# Patient Record
Sex: FEMALE | Race: WHITE | NOT HISPANIC OR LATINO | Employment: UNEMPLOYED | URBAN - METROPOLITAN AREA
[De-identification: names, ages, dates, MRNs, and addresses within clinical notes are randomized per-mention and may not be internally consistent; named-entity substitution may affect disease eponyms.]

---

## 2023-09-19 LAB
BASOPHILS (10*3/UL) IN BLOOD BY AUTOMATED COUNT: 0.03 X10E9/L (ref 0–0.1)
BASOPHILS/100 LEUKOCYTES IN BLOOD BY AUTOMATED COUNT: 0.3 % (ref 0–2)
EOSINOPHILS (10*3/UL) IN BLOOD BY AUTOMATED COUNT: 0.11 X10E9/L (ref 0–0.4)
EOSINOPHILS/100 LEUKOCYTES IN BLOOD BY AUTOMATED COUNT: 1.3 % (ref 0–6)
ERYTHROCYTE DISTRIBUTION WIDTH (RATIO) BY AUTOMATED COUNT: 15.9 % (ref 11.5–14.5)
ERYTHROCYTE MEAN CORPUSCULAR HEMOGLOBIN CONCENTRATION (G/DL) BY AUTOMATED: 31.5 G/DL (ref 32–36)
ERYTHROCYTE MEAN CORPUSCULAR VOLUME (FL) BY AUTOMATED COUNT: 95 FL (ref 80–100)
ERYTHROCYTES (10*6/UL) IN BLOOD BY AUTOMATED COUNT: 4 X10E12/L (ref 4–5.2)
HEMATOCRIT (%) IN BLOOD BY AUTOMATED COUNT: 38.1 % (ref 36–46)
HEMOGLOBIN (G/DL) IN BLOOD: 12 G/DL (ref 12–16)
IMMATURE GRANULOCYTES/100 LEUKOCYTES IN BLOOD BY AUTOMATED COUNT: 1.3 % (ref 0–0.9)
IRON (UG/DL) IN SER/PLAS: 45 UG/DL (ref 35–150)
LEUKOCYTES (10*3/UL) IN BLOOD BY AUTOMATED COUNT: 8.6 X10E9/L (ref 4.4–11.3)
LYMPHOCYTES (10*3/UL) IN BLOOD BY AUTOMATED COUNT: 1.24 X10E9/L (ref 0.8–3)
LYMPHOCYTES/100 LEUKOCYTES IN BLOOD BY AUTOMATED COUNT: 14.4 % (ref 13–44)
MONOCYTES (10*3/UL) IN BLOOD BY AUTOMATED COUNT: 0.4 X10E9/L (ref 0.05–0.8)
MONOCYTES/100 LEUKOCYTES IN BLOOD BY AUTOMATED COUNT: 4.6 % (ref 2–10)
NEUTROPHILS (10*3/UL) IN BLOOD BY AUTOMATED COUNT: 6.75 X10E9/L (ref 1.6–5.5)
NEUTROPHILS/100 LEUKOCYTES IN BLOOD BY AUTOMATED COUNT: 78.1 % (ref 40–80)
PLATELETS (10*3/UL) IN BLOOD AUTOMATED COUNT: 370 X10E9/L (ref 150–450)

## 2023-11-21 ENCOUNTER — OFFICE VISIT (OUTPATIENT)
Dept: GASTROENTEROLOGY | Facility: CLINIC | Age: 78
End: 2023-11-21
Payer: MEDICARE

## 2023-11-21 VITALS
DIASTOLIC BLOOD PRESSURE: 82 MMHG | WEIGHT: 167 LBS | BODY MASS INDEX: 34.9 KG/M2 | HEART RATE: 109 BPM | SYSTOLIC BLOOD PRESSURE: 144 MMHG

## 2023-11-21 DIAGNOSIS — R19.7 DIARRHEA, UNSPECIFIED TYPE: Primary | ICD-10-CM

## 2023-11-21 DIAGNOSIS — R10.13 EPIGASTRIC PAIN: ICD-10-CM

## 2023-11-21 PROCEDURE — 1159F MED LIST DOCD IN RCRD: CPT | Performed by: INTERNAL MEDICINE

## 2023-11-21 PROCEDURE — 99214 OFFICE O/P EST MOD 30 MIN: CPT | Performed by: INTERNAL MEDICINE

## 2023-11-21 PROCEDURE — 1160F RVW MEDS BY RX/DR IN RCRD: CPT | Performed by: INTERNAL MEDICINE

## 2023-11-21 PROCEDURE — 1126F AMNT PAIN NOTED NONE PRSNT: CPT | Performed by: INTERNAL MEDICINE

## 2023-11-21 RX ORDER — ROSUVASTATIN CALCIUM 10 MG/1
10 TABLET, COATED ORAL DAILY
COMMUNITY

## 2023-11-21 RX ORDER — METFORMIN HYDROCHLORIDE 500 MG/1
500 TABLET ORAL
COMMUNITY

## 2023-11-21 RX ORDER — PIOGLITAZONEHYDROCHLORIDE 15 MG/1
15 TABLET ORAL DAILY
COMMUNITY

## 2023-11-21 RX ORDER — LOSARTAN POTASSIUM 50 MG/1
50 TABLET ORAL DAILY
COMMUNITY

## 2023-11-21 RX ORDER — AMLODIPINE BESYLATE 5 MG/1
TABLET ORAL DAILY
COMMUNITY

## 2023-11-21 NOTE — PATIENT INSTRUCTIONS
You can use Imodium (Loperamide) as needed to help with diarrhea. It is fine if you take this on a regular basis. Make sure that you are not becoming constipated.      You have been scheduled for an upper endoscopy (EGD).  You were given instructions for preparing for this test in the office today.  If you have questions about these instructions, please call my office at [phone].    After your procedure, you can expect me to talk to you to go over the results of the procedure.    You were also given information regarding the schedule for your procedure including the time that you need to arrive to the endoscopy unit.  You will also be contacted 2-3 day prior to your procedure to confirm the final arrival time.  If you have questions about this or if you need to cancel or change this appointment please call my office at [phone].      Follow up in 4-6 months.

## 2023-11-21 NOTE — PROGRESS NOTES
[facility]    ASSESSMENT and PLAN:       Love Guzman is a 78 y.o. female with a significant past medical history of DM2, CKD, obesity, RA, HTN, and HLD who presents for follow up of diarrhea.       Diarrhea and fecal incontinence  Persistent symptoms with no significant findings on recent work up including labs and colonoscopy with random biopsies. No red flag or alarm symptoms. Suspect some degree of dietary issues and possibly lactose intolerance. Functional GI disease possible as well. Continue bulk forming fiber supplement and Imodium as needed for symptom management.  - consider avoidance of dairy  - fiber supplement  - PRN Imodium      Abdominal Pain  Intermittent symptoms that may be related to diarrhea/functional GI disease, but persistent N/V is atypical. No typical symptoms of GERD/dyspepsia. Will plan for EGD for further evaluation.  - EGD scheduled        Follow up in 4-6 months.        Chris Emery MD        Gastroenterology    [facility]    Clinical   [facility]        Subjective   HISTORY OF PRESENT ILLNESS:     Chief Complaint  Diarrhea    History Of Present Illness:    Love Guzman is a 78 y.o. female with a significant past medical history of DM2, CKD, obesity, RA, HTN, and HLD who presents for follow up of diarrhea.    She follows with Rachelle Cespedes MD as her PCP.       She was seen by her PCP in July 2022 at which time she was referred to GI for incontinence. Labs at that time including CBC, TSH, and CMP were unremarkable. She previously had a colonoscopy in 2010 which showed diverticulosis and hemorrhoids.     I had initially seen her in September 2022 I ordered labs which showed a normal TSH, normal CRP, negative TTG (IgA), normal calprotectin, and normal elastase. Other labs have shown an unremarkable CBC, normal CMP (except for mild/stable CKD), normal  lipase, and normal TSH. I had also recommended a fiber supplement.    We also did a colonoscopy on 9/27/2022 which was normal except for diverticulosis. Random biopsies were normal with no evidence of microscopic colitis.      Today she says she has continued to have issues with diarrhea and abdominal pain. She says that her PCP advised her to get an upper endoscopy done for additional evaluation.    Currently she is having 5-6 BMs per day which are loose/watery. No blood in her stool and no nocturnal BMs. She has not noticed any particular triggers. She does say that she eats a lot of dairy. She has some episodes of incontinence when she can walk fast enough to make it to the bathroom. She is using Imodium as needed and she will usually take one pill on days when she needs to leave the house and then she will have 1 BM the remainder of the day.    She describes intermittent epigastric pain that is aching and cramping. This has been happening randomly with no clear trigger. It usually lasts 5-10 minutes and then resolves on its own. She will have some nausea and vomiting when she has this pain.       Patient denies any heartburn/GERD, dysphagia, odynophagia, constipation, hematemesis, hematochezia, melena, or weight loss.      Review of systems:   Review of Systems   Constitutional:  Negative for chills, fatigue, fever and unexpected weight change.   HENT:  Negative for congestion, sneezing, sore throat, trouble swallowing and voice change.    Respiratory:  Negative for cough, shortness of breath and wheezing.    Cardiovascular:  Negative for chest pain, palpitations and leg swelling.   Gastrointestinal:         As detailed above.   Genitourinary:  Negative for dysuria and hematuria.   Musculoskeletal:  Positive for arthralgias. Negative for myalgias.   Skin:  Negative for pallor and rash.   Neurological:  Negative for dizziness, seizures, syncope, weakness, numbness and headaches.   Hematological:  Negative for  adenopathy. Does not bruise/bleed easily.   Psychiatric/Behavioral:  Negative for confusion. The patient is not nervous/anxious.    All other systems reviewed and are negative.      I performed a complete 10 point review of systems and it is negative except as noted in HPI or above.      PAST HISTORIES:       Past Medical History:  She has a past medical history of Personal history of other diseases of the circulatory system and Personal history of other endocrine, nutritional and metabolic disease.    Past Surgical History:  She has a past surgical history that includes Other surgical history (04/14/2022) and Other surgical history (04/14/2022).      Social History:  She reports that she has never smoked. She does not have any smokeless tobacco history on file. She reports that she does not drink alcohol and does not use drugs.    Family History:  Her sister had colon cancer diagnosed in her 40s. She otherwise denies any family history of GI disease.    Family History   Problem Relation Name Age of Onset    Colon cancer Sister          Allergies:  Iodinated contrast media      Objective   OBJECTIVE:       Last Recorded Vitals:  Vitals:    11/21/23 1543   BP: 144/82   BP Location: Left arm   Pulse: 109   Weight: 75.8 kg (167 lb)     /82 (BP Location: Left arm)   Pulse 109   Wt 75.8 kg (167 lb)   BMI 34.90 kg/m²      Physical Exam:    Physical Exam  Vitals reviewed.   Constitutional:       General: She is not in acute distress.     Appearance: She is obese. She is not ill-appearing.   HENT:      Head: Normocephalic and atraumatic.   Eyes:      General: No scleral icterus.  Cardiovascular:      Rate and Rhythm: Normal rate and regular rhythm.      Pulses: Normal pulses.      Heart sounds: Normal heart sounds. No murmur heard.  Pulmonary:      Effort: Pulmonary effort is normal. No respiratory distress.      Breath sounds: Normal breath sounds. No wheezing.   Abdominal:      General: Bowel sounds are normal.       Palpations: Abdomen is soft.      Tenderness: There is no abdominal tenderness. There is no rebound.   Musculoskeletal:         General: No swelling or deformity.      Comments: Slow gait   Skin:     General: Skin is warm and dry.      Coloration: Skin is not jaundiced.      Findings: No rash.   Neurological:      General: No focal deficit present.      Mental Status: She is alert and oriented to person, place, and time.   Psychiatric:         Mood and Affect: Mood normal.         Behavior: Behavior normal.         Thought Content: Thought content normal.         Judgment: Judgment normal.       Home Medications:  Prior to Admission medications    Not on File         Relevant Results Recent labs reviewed in the EMR.  Lab Results   Component Value Date    HGB 12.0 09/19/2023    HGB 11.6 (L) 09/01/2023    HGB 10.1 (L) 02/24/2023    MCV 95 09/19/2023    MCV 93 09/01/2023    MCV 89 02/24/2023     09/19/2023     09/01/2023     02/24/2023       Lab Results   Component Value Date    IRON 45 09/19/2023       Lab Results   Component Value Date     09/01/2023    K 3.9 09/01/2023     09/01/2023    BUN 26 (H) 09/01/2023    CREATININE 1.15 (H) 09/01/2023       Lab Results   Component Value Date    BILITOT 0.5 09/01/2023     Lab Results   Component Value Date    ALT 34 09/01/2023     (H) 02/24/2023    ALT 28 02/22/2023    AST 22 09/01/2023     (H) 02/24/2023    AST 39 02/22/2023    ALKPHOS 66 09/01/2023    ALKPHOS 66 02/24/2023    ALKPHOS 70 02/22/2023       Lab Results   Component Value Date    CRP 0.20 09/08/2022       Calprotectin, Stool   Date Value Ref Range Status   09/15/2022 10 <=49 ug/g Final     Comment:     REFERENCE INTERVAL: Calprotectin, Fecal by Immunoassay    Less than 50 ug/g.........Normal     ug/g...............Borderline elevated, test should be                              re-evaluated in 4-6 weeks.    121 ug/g or greater.......Elevated  Performed By:  [facility]  [address] [place] [id number]  : Darren Del Valle MD, PhD         Radiology: Reviewed imaging reviewed in the EMR.  No results found.

## 2023-11-21 NOTE — LETTER
November 22, 2023     Rachelle Cespedes MD  [address] [facility] [address]    Patient: Love Guzman   YOB: 1945   Date of Visit: 11/21/2023       Dear Dr. Rachlele Cespedes MD:    Thank you for referring Love Guzman to me for evaluation. Below are my notes for this consultation.  If you have questions, please do not hesitate to call me. I look forward to following your patient along with you.       Sincerely,     Chris Emery MD      CC: No Recipients  ______________________________________________________________________________________        [facility]    ASSESSMENT and PLAN:       Love Guzman is a 78 y.o. female with a significant past medical history of DM2, CKD, obesity, RA, HTN, and HLD who presents for follow up of diarrhea.       Diarrhea and fecal incontinence  Persistent symptoms with no significant findings on recent work up including labs and colonoscopy with random biopsies. No red flag or alarm symptoms. Suspect some degree of dietary issues and possibly lactose intolerance. Functional GI disease possible as well. Continue bulk forming fiber supplement and Imodium as needed for symptom management.  - consider avoidance of dairy  - fiber supplement  - PRN Imodium      Abdominal Pain  Intermittent symptoms that may be related to diarrhea/functional GI disease, but persistent N/V is atypical. No typical symptoms of GERD/dyspepsia. Will plan for EGD for further evaluation.  - EGD scheduled        Follow up in 4-6 months.        Chris Emery MD        Gastroenterology    [facility]    Clinical   [facility]        Subjective   HISTORY OF PRESENT ILLNESS:     Chief Complaint  Diarrhea    History Of Present Illness:    Love Guzman is a 78 y.o. female with a significant past medical history of DM2, CKD, obesity, RA, HTN, and HLD who  presents for follow up of diarrhea.    She follows with Rachelle Cespedes MD as her PCP.       She was seen by her PCP in July 2022 at which time she was referred to GI for incontinence. Labs at that time including CBC, TSH, and CMP were unremarkable. She previously had a colonoscopy in 2010 which showed diverticulosis and hemorrhoids.     I had initially seen her in September 2022 I ordered labs which showed a normal TSH, normal CRP, negative TTG (IgA), normal calprotectin, and normal elastase. Other labs have shown an unremarkable CBC, normal CMP (except for mild/stable CKD), normal lipase, and normal TSH. I had also recommended a fiber supplement.    We also did a colonoscopy on 9/27/2022 which was normal except for diverticulosis. Random biopsies were normal with no evidence of microscopic colitis.      Today she says she has continued to have issues with diarrhea and abdominal pain. She says that her PCP advised her to get an upper endoscopy done for additional evaluation.    Currently she is having 5-6 BMs per day which are loose/watery. No blood in her stool and no nocturnal BMs. She has not noticed any particular triggers. She does say that she eats a lot of dairy. She has some episodes of incontinence when she can walk fast enough to make it to the bathroom. She is using Imodium as needed and she will usually take one pill on days when she needs to leave the house and then she will have 1 BM the remainder of the day.    She describes intermittent epigastric pain that is aching and cramping. This has been happening randomly with no clear trigger. It usually lasts 5-10 minutes and then resolves on its own. She will have some nausea and vomiting when she has this pain.       Patient denies any heartburn/GERD, dysphagia, odynophagia, constipation, hematemesis, hematochezia, melena, or weight loss.      Review of systems:   Review of Systems   Constitutional:  Negative for chills, fatigue, fever and  unexpected weight change.   HENT:  Negative for congestion, sneezing, sore throat, trouble swallowing and voice change.    Respiratory:  Negative for cough, shortness of breath and wheezing.    Cardiovascular:  Negative for chest pain, palpitations and leg swelling.   Gastrointestinal:         As detailed above.   Genitourinary:  Negative for dysuria and hematuria.   Musculoskeletal:  Positive for arthralgias. Negative for myalgias.   Skin:  Negative for pallor and rash.   Neurological:  Negative for dizziness, seizures, syncope, weakness, numbness and headaches.   Hematological:  Negative for adenopathy. Does not bruise/bleed easily.   Psychiatric/Behavioral:  Negative for confusion. The patient is not nervous/anxious.    All other systems reviewed and are negative.      I performed a complete 10 point review of systems and it is negative except as noted in HPI or above.      PAST HISTORIES:       Past Medical History:  She has a past medical history of Personal history of other diseases of the circulatory system and Personal history of other endocrine, nutritional and metabolic disease.    Past Surgical History:  She has a past surgical history that includes Other surgical history (04/14/2022) and Other surgical history (04/14/2022).      Social History:  She reports that she has never smoked. She does not have any smokeless tobacco history on file. She reports that she does not drink alcohol and does not use drugs.    Family History:  Her sister had colon cancer diagnosed in her 40s. She otherwise denies any family history of GI disease.    Family History   Problem Relation Name Age of Onset   • Colon cancer Sister          Allergies:  Iodinated contrast media      Objective   OBJECTIVE:       Last Recorded Vitals:  Vitals:    11/21/23 1543   BP: 144/82   BP Location: Left arm   Pulse: 109   Weight: 75.8 kg (167 lb)     /82 (BP Location: Left arm)   Pulse 109   Wt 75.8 kg (167 lb)   BMI 34.90 kg/m²       Physical Exam:    Physical Exam  Vitals reviewed.   Constitutional:       General: She is not in acute distress.     Appearance: She is obese. She is not ill-appearing.   HENT:      Head: Normocephalic and atraumatic.   Eyes:      General: No scleral icterus.  Cardiovascular:      Rate and Rhythm: Normal rate and regular rhythm.      Pulses: Normal pulses.      Heart sounds: Normal heart sounds. No murmur heard.  Pulmonary:      Effort: Pulmonary effort is normal. No respiratory distress.      Breath sounds: Normal breath sounds. No wheezing.   Abdominal:      General: Bowel sounds are normal.      Palpations: Abdomen is soft.      Tenderness: There is no abdominal tenderness. There is no rebound.   Musculoskeletal:         General: No swelling or deformity.      Comments: Slow gait   Skin:     General: Skin is warm and dry.      Coloration: Skin is not jaundiced.      Findings: No rash.   Neurological:      General: No focal deficit present.      Mental Status: She is alert and oriented to person, place, and time.   Psychiatric:         Mood and Affect: Mood normal.         Behavior: Behavior normal.         Thought Content: Thought content normal.         Judgment: Judgment normal.       Home Medications:  Prior to Admission medications    Not on File         Relevant Results Recent labs reviewed in the EMR.  Lab Results   Component Value Date    HGB 12.0 09/19/2023    HGB 11.6 (L) 09/01/2023    HGB 10.1 (L) 02/24/2023    MCV 95 09/19/2023    MCV 93 09/01/2023    MCV 89 02/24/2023     09/19/2023     09/01/2023     02/24/2023       Lab Results   Component Value Date    IRON 45 09/19/2023       Lab Results   Component Value Date     09/01/2023    K 3.9 09/01/2023     09/01/2023    BUN 26 (H) 09/01/2023    CREATININE 1.15 (H) 09/01/2023       Lab Results   Component Value Date    BILITOT 0.5 09/01/2023     Lab Results   Component Value Date    ALT 34 09/01/2023     (H)  02/24/2023    ALT 28 02/22/2023    AST 22 09/01/2023     (H) 02/24/2023    AST 39 02/22/2023    ALKPHOS 66 09/01/2023    ALKPHOS 66 02/24/2023    ALKPHOS 70 02/22/2023       Lab Results   Component Value Date    CRP 0.20 09/08/2022       Calprotectin, Stool   Date Value Ref Range Status   09/15/2022 10 <=49 ug/g Final     Comment:     REFERENCE INTERVAL: Calprotectin, Fecal by Immunoassay    Less than 50 ug/g.........Normal     ug/g...............Borderline elevated, test should be                              re-evaluated in 4-6 weeks.    121 ug/g or greater.......Elevated  Performed By: [facility]  [address] [place] [id number]  : Darren Del Valle MD, PhD         Radiology: Reviewed imaging reviewed in the EMR.  No results found.

## 2023-11-22 ASSESSMENT — ENCOUNTER SYMPTOMS
ROS GI COMMENTS: AS DETAILED ABOVE.
ARTHRALGIAS: 1
TROUBLE SWALLOWING: 0
PALPITATIONS: 0
SHORTNESS OF BREATH: 0
HEADACHES: 0
CHILLS: 0
UNEXPECTED WEIGHT CHANGE: 0
DIZZINESS: 0
COUGH: 0
HEMATURIA: 0
WEAKNESS: 0
SORE THROAT: 0
FEVER: 0
NUMBNESS: 0
MYALGIAS: 0
ADENOPATHY: 0
NERVOUS/ANXIOUS: 0
SEIZURES: 0
WHEEZING: 0
DYSURIA: 0
VOICE CHANGE: 0
FATIGUE: 0
CONFUSION: 0
BRUISES/BLEEDS EASILY: 0

## 2023-11-28 ENCOUNTER — TELEPHONE (OUTPATIENT)
Dept: GASTROENTEROLOGY | Facility: CLINIC | Age: 78
End: 2023-11-28
Payer: MEDICARE

## 2023-12-28 ENCOUNTER — HOSPITAL ENCOUNTER (OUTPATIENT)
Dept: RADIOLOGY | Facility: HOSPITAL | Age: 78
Discharge: HOME | End: 2023-12-28
Payer: MEDICARE

## 2023-12-28 DIAGNOSIS — M79.671 PAIN IN RIGHT FOOT: ICD-10-CM

## 2023-12-28 PROCEDURE — 73630 X-RAY EXAM OF FOOT: CPT | Mod: RT

## 2023-12-28 PROCEDURE — 73630 X-RAY EXAM OF FOOT: CPT | Mod: RIGHT SIDE | Performed by: RADIOLOGY

## 2024-04-10 ENCOUNTER — TELEPHONE (OUTPATIENT)
Dept: GASTROENTEROLOGY | Facility: CLINIC | Age: 79
End: 2024-04-10
Payer: MEDICARE

## 2024-04-10 NOTE — TELEPHONE ENCOUNTER
Reminder for 6 month follow up with molly was in the workque. I called patient to schedule and she stated that she was not coming back to our office. Reminder cancelled.

## 2024-05-07 ENCOUNTER — HOSPITAL ENCOUNTER (OUTPATIENT)
Dept: RADIOLOGY | Facility: CLINIC | Age: 79
Discharge: HOME | End: 2024-05-07
Payer: MEDICARE

## 2024-05-07 DIAGNOSIS — Z78.0 ASYMPTOMATIC MENOPAUSAL STATE: ICD-10-CM

## 2024-05-07 PROCEDURE — 77080 DXA BONE DENSITY AXIAL: CPT | Performed by: RADIOLOGY

## 2024-05-07 PROCEDURE — 77080 DXA BONE DENSITY AXIAL: CPT

## 2024-10-22 ENCOUNTER — APPOINTMENT (OUTPATIENT)
Dept: RADIOLOGY | Facility: HOSPITAL | Age: 79
End: 2024-10-22
Payer: MEDICARE

## 2024-10-22 ENCOUNTER — HOSPITAL ENCOUNTER (EMERGENCY)
Facility: HOSPITAL | Age: 79
Discharge: HOME | End: 2024-10-22
Payer: MEDICARE

## 2024-10-22 ENCOUNTER — PHARMACY VISIT (OUTPATIENT)
Dept: PHARMACY | Facility: CLINIC | Age: 79
End: 2024-10-22
Payer: COMMERCIAL

## 2024-10-22 VITALS
RESPIRATION RATE: 18 BRPM | WEIGHT: 164 LBS | HEART RATE: 95 BPM | BODY MASS INDEX: 34.43 KG/M2 | OXYGEN SATURATION: 97 % | HEIGHT: 58 IN | TEMPERATURE: 98.2 F | SYSTOLIC BLOOD PRESSURE: 142 MMHG | DIASTOLIC BLOOD PRESSURE: 76 MMHG

## 2024-10-22 DIAGNOSIS — S93.602A SPRAIN OF LEFT FOOT, INITIAL ENCOUNTER: Primary | ICD-10-CM

## 2024-10-22 PROCEDURE — 73630 X-RAY EXAM OF FOOT: CPT | Mod: LEFT SIDE | Performed by: RADIOLOGY

## 2024-10-22 PROCEDURE — RXMED WILLOW AMBULATORY MEDICATION CHARGE

## 2024-10-22 PROCEDURE — 99283 EMERGENCY DEPT VISIT LOW MDM: CPT | Performed by: NURSE PRACTITIONER

## 2024-10-22 PROCEDURE — 73630 X-RAY EXAM OF FOOT: CPT | Mod: LT

## 2024-10-22 RX ORDER — TRAMADOL HYDROCHLORIDE 50 MG/1
50 TABLET ORAL EVERY 6 HOURS PRN
Qty: 10 TABLET | Refills: 0 | Status: SHIPPED | OUTPATIENT
Start: 2024-10-22 | End: 2024-10-25

## 2024-10-22 ASSESSMENT — PAIN DESCRIPTION - DESCRIPTORS: DESCRIPTORS: ACHING

## 2024-10-22 ASSESSMENT — PAIN DESCRIPTION - LOCATION: LOCATION: FOOT

## 2024-10-22 ASSESSMENT — PAIN DESCRIPTION - ORIENTATION: ORIENTATION: LEFT

## 2024-10-22 ASSESSMENT — PAIN - FUNCTIONAL ASSESSMENT: PAIN_FUNCTIONAL_ASSESSMENT: 0-10

## 2024-10-22 ASSESSMENT — COLUMBIA-SUICIDE SEVERITY RATING SCALE - C-SSRS
1. IN THE PAST MONTH, HAVE YOU WISHED YOU WERE DEAD OR WISHED YOU COULD GO TO SLEEP AND NOT WAKE UP?: NO
6. HAVE YOU EVER DONE ANYTHING, STARTED TO DO ANYTHING, OR PREPARED TO DO ANYTHING TO END YOUR LIFE?: NO
2. HAVE YOU ACTUALLY HAD ANY THOUGHTS OF KILLING YOURSELF?: NO

## 2024-10-22 NOTE — ED PROVIDER NOTES
HPI   Chief Complaint   Patient presents with    left foot pain       Patient is a 79-year-old  female presented to the emergency room with complaints of left foot pain.  The patient was walking about a week and a half ago and lost her balance.  The patient states that she did not fall and she was able to catch herself.  The patient reports she did not have any pain immediately.  She started to experience pain on the lateral aspect of the left foot and the plantar aspect of the foot 3 to 4 days ago.  She denies any other injury.  The pain is worse with weightbearing.  The pain is radiating from the foot up the leg.  The patient rates her discomfort a 7 out of 10 on the pain scale.  Denies any loss of sensation to the extremity.      History provided by:  Patient   used: No            Patient History   Past Medical History:   Diagnosis Date    Personal history of other diseases of the circulatory system     History of hypertension    Personal history of other endocrine, nutritional and metabolic disease     H/O hyperlipidemia     Past Surgical History:   Procedure Laterality Date    OTHER SURGICAL HISTORY  04/14/2022    Hysterectomy    OTHER SURGICAL HISTORY  04/14/2022    Lithotripsy     Family History   Problem Relation Name Age of Onset    Colon cancer Sister       Social History     Tobacco Use    Smoking status: Never    Smokeless tobacco: Not on file   Substance Use Topics    Alcohol use: Never    Drug use: Never       Physical Exam   ED Triage Vitals [10/22/24 1348]   Temperature Heart Rate Respirations BP   36.8 °C (98.2 °F) 95 18 142/76      Pulse Ox Temp Source Heart Rate Source Patient Position   97 % Temporal Monitor Sitting      BP Location FiO2 (%)     Left arm --       Physical Exam  Vitals reviewed.   Cardiovascular:      Rate and Rhythm: Normal rate and regular rhythm.      Pulses: Normal pulses.      Heart sounds: Normal heart sounds.   Pulmonary:      Effort: Pulmonary  effort is normal.      Breath sounds: Normal breath sounds.   Musculoskeletal:      Comments: The patient has no obvious trauma noted to the left foot.  The patient has focal tenderness with palpation of the plantar aspect of the foot.  Patient is able to move the distal extremity with normal range of motion and muscle strength.  No calf pain.  Distal extremity brisk cap refill and sensation intact.  Negative Lim's test.  Gait is stable.   Skin:     General: Skin is warm.      Capillary Refill: Capillary refill takes less than 2 seconds.   Neurological:      General: No focal deficit present.   Psychiatric:         Mood and Affect: Mood normal.           ED Course & MDM   Diagnoses as of 10/22/24 1611   Sprain of left foot, initial encounter                 No data recorded     [place] Coma Scale Score: 15 (10/22/24 1350 : Dionna Del Cid RN)                           Medical Decision Making  Patient was seen and evaluated by the nurse practitioner, Sue Patel.  The patient is presenting to the emergency room complaints of left foot pain.  The patient has no obvious wounds or cellulitic process noted to the foot.  Differential diagnosis includes occult fracture, dislocation, sprain, plantar fasciitis, Achilles tendinitis, or other acute process.  The patient does not have any obvious signs of Achilles injury.  An x-ray of the foot was performed and revealed a calcaneal enthesophyte without any obvious injury appreciated.  No joint effusion, no radiopaque foreign body.  The patient was notified of the imaging results.  The patient was placed in a postop shoe by nursing staff.  She was neurovascularly intact after shoe placement.  The patient was educated on RICE therapy and provided a prescription for naproxen for pain.  She was referred to podiatry for further evaluation and treatment.  The patient is to return if worse in any way.  She was discharged in stable condition with computer discharge instructions  given.        Procedure  Procedures     Sue Patel, APRN-CNP  10/22/24 3050

## 2025-02-20 ENCOUNTER — APPOINTMENT (OUTPATIENT)
Dept: RADIOLOGY | Facility: HOSPITAL | Age: 80
End: 2025-02-20
Payer: MEDICARE

## 2025-02-20 ENCOUNTER — PHARMACY VISIT (OUTPATIENT)
Dept: PHARMACY | Facility: CLINIC | Age: 80
End: 2025-02-20
Payer: COMMERCIAL

## 2025-02-20 ENCOUNTER — HOSPITAL ENCOUNTER (EMERGENCY)
Facility: HOSPITAL | Age: 80
Discharge: HOME | End: 2025-02-20
Payer: MEDICARE

## 2025-02-20 VITALS
DIASTOLIC BLOOD PRESSURE: 80 MMHG | OXYGEN SATURATION: 98 % | WEIGHT: 164 LBS | RESPIRATION RATE: 18 BRPM | SYSTOLIC BLOOD PRESSURE: 169 MMHG | TEMPERATURE: 97.3 F | HEART RATE: 99 BPM | HEIGHT: 58 IN | BODY MASS INDEX: 34.43 KG/M2

## 2025-02-20 DIAGNOSIS — M25.562 ACUTE PAIN OF LEFT KNEE: Primary | ICD-10-CM

## 2025-02-20 PROCEDURE — 99283 EMERGENCY DEPT VISIT LOW MDM: CPT

## 2025-02-20 PROCEDURE — 73564 X-RAY EXAM KNEE 4 OR MORE: CPT | Mod: LT

## 2025-02-20 PROCEDURE — RXMED WILLOW AMBULATORY MEDICATION CHARGE

## 2025-02-20 PROCEDURE — 73564 X-RAY EXAM KNEE 4 OR MORE: CPT | Mod: LEFT SIDE | Performed by: RADIOLOGY

## 2025-02-20 RX ORDER — MELOXICAM 15 MG/1
15 TABLET ORAL DAILY
Qty: 10 TABLET | Refills: 0 | Status: SHIPPED | OUTPATIENT
Start: 2025-02-20

## 2025-02-20 ASSESSMENT — PAIN - FUNCTIONAL ASSESSMENT: PAIN_FUNCTIONAL_ASSESSMENT: 0-10

## 2025-02-20 ASSESSMENT — PAIN DESCRIPTION - PAIN TYPE: TYPE: ACUTE PAIN

## 2025-02-20 ASSESSMENT — LIFESTYLE VARIABLES
EVER HAD A DRINK FIRST THING IN THE MORNING TO STEADY YOUR NERVES TO GET RID OF A HANGOVER: NO
EVER FELT BAD OR GUILTY ABOUT YOUR DRINKING: NO
HAVE PEOPLE ANNOYED YOU BY CRITICIZING YOUR DRINKING: NO
HAVE YOU EVER FELT YOU SHOULD CUT DOWN ON YOUR DRINKING: NO
TOTAL SCORE: 0

## 2025-02-20 ASSESSMENT — PAIN DESCRIPTION - LOCATION: LOCATION: KNEE

## 2025-02-20 ASSESSMENT — COLUMBIA-SUICIDE SEVERITY RATING SCALE - C-SSRS
6. HAVE YOU EVER DONE ANYTHING, STARTED TO DO ANYTHING, OR PREPARED TO DO ANYTHING TO END YOUR LIFE?: NO
2. HAVE YOU ACTUALLY HAD ANY THOUGHTS OF KILLING YOURSELF?: NO
1. IN THE PAST MONTH, HAVE YOU WISHED YOU WERE DEAD OR WISHED YOU COULD GO TO SLEEP AND NOT WAKE UP?: NO

## 2025-02-20 ASSESSMENT — PAIN DESCRIPTION - FREQUENCY: FREQUENCY: CONSTANT/CONTINUOUS

## 2025-02-20 ASSESSMENT — PAIN DESCRIPTION - ORIENTATION: ORIENTATION: LEFT

## 2025-02-20 ASSESSMENT — PAIN SCALES - GENERAL: PAINLEVEL_OUTOF10: 10 - WORST POSSIBLE PAIN

## 2025-02-20 ASSESSMENT — PAIN DESCRIPTION - DESCRIPTORS
DESCRIPTORS: SHARP
DESCRIPTORS: SHARP

## 2025-02-20 NOTE — ED PROVIDER NOTES
Chief Complaint   Patient presents with   • Knee Pain     Pt reports that for a week now, there has been a sharp pain in her left knee. Pt states it is hard to get around due to not being able to bear all her weight on it. Pt reports no injury that she is aware of at this time       HPI       79 year old female presents to the Emergency Department today complaining of a 1 week history of left knee pain that she describes as sharp in nature, constant, on-radiating, and varies in intensity. Denies any associated fever, chills, headache, neck pain, chest pain, shortness of breath, abdominal pain, nausea, vomiting, diarrhea, constipation, or urinary symptoms. No injury/trauma to the area.       History provided by:  Patient             Patient History   Past Medical History:   Diagnosis Date   • Personal history of other diseases of the circulatory system     History of hypertension   • Personal history of other endocrine, nutritional and metabolic disease     H/O hyperlipidemia     Past Surgical History:   Procedure Laterality Date   • OTHER SURGICAL HISTORY  04/14/2022    Hysterectomy   • OTHER SURGICAL HISTORY  04/14/2022    Lithotripsy     Family History   Problem Relation Name Age of Onset   • Colon cancer Sister       Social History     Tobacco Use   • Smoking status: Never   • Smokeless tobacco: Not on file   Substance Use Topics   • Alcohol use: Never   • Drug use: Never           Physical Exam  Constitutional:       General: She is awake.      Appearance: Normal appearance.   Cardiovascular:      Rate and Rhythm: Normal rate and regular rhythm.      Pulses:           Radial pulses are 3+ on the right side and 3+ on the left side.      Heart sounds: Normal heart sounds. No murmur heard.     No friction rub. No gallop.   Pulmonary:      Effort: Pulmonary effort is normal.      Breath sounds: Normal breath sounds and air entry.   Musculoskeletal:      Comments: No obvious deformity, ecchymosis, or edema noted to  the left knee, but there is tenderness present over the patella. Full ROM. Negative anterior/posterior drawer, varus/valgus strain, or Britton's test. :eft dorsalis pedis pulse is strong and regular. Capillary refill was within normal limits. Sensation is intact distally.    Neurological:      Mental Status: She is alert.   Psychiatric:         Behavior: Behavior is cooperative.         Labs Reviewed - No data to display    XR knee left 4+ views   Final Result   1. Nonspecific medial knee soft tissue swelling. Consider further   evaluation with knee MRI as clinically warranted..   2. New ill-defined radiodensities posteriorly adjacent to the lateral   femoral condyle, for which the differential diagnosis includes a   fabella, heterotopic calcification from calcific tendinitis or prior   ligamentous injury, or even crystal deposition. An avulsion fracture   is considered to be less likely.                  I personally reviewed the images/study and I agree with the findings   as stated by Radiology resident.        MACRO:   None        Signed by: Enma Still 2/20/2025 10:46 AM   Dictation workstation:   [id number]               ED Course & MDM   Diagnoses as of 02/20/25 1146   Acute pain of left knee           Medical Decision Making  Patient was seen and evaluated by myself. There are no signs of cellulitis or septic joint. Left knee x-ray shows nonspecific medial knee soft tissue swelling; new ill-defined radiodensities posteriorly adjacent to the lateral femoral condyle, for which the differential diagnosis includes a fabella, heterotopic calcification from calcific tendinitis or prior ligamentous injury, or even crystal deposition; and an avulsion fracture is considered to be less likely. Given a prescription for Mobic. No contraindications to NSAIDs are noted. Given orthopedics for follow up. Return if worse in any way. Discharged in stable condition with computer instructions.     Diagnostic  Impression:    1. Acute knee pain    2. Prescription therapy              Your medication list        ASK your doctor about these medications        Instructions Last Dose Given Next Dose Due   amLODIPine 5 mg tablet  Commonly known as: Norvasc           losartan 50 mg tablet  Commonly known as: Cozaar           metFORMIN 500 mg tablet  Commonly known as: Glucophage           pioglitazone 15 mg tablet  Commonly known as: Actos           rosuvastatin 10 mg tablet  Commonly known as: Crestor                      Procedure  Procedures     Palomo Reyna, SOPHIE-CNP  02/20/25 1145

## 2025-02-26 ENCOUNTER — OFFICE VISIT (OUTPATIENT)
Dept: ORTHOPEDIC SURGERY | Facility: HOSPITAL | Age: 80
End: 2025-02-26
Payer: MEDICARE

## 2025-02-26 VITALS — HEIGHT: 58 IN | BODY MASS INDEX: 34.43 KG/M2 | WEIGHT: 164 LBS

## 2025-02-26 DIAGNOSIS — M17.12 PRIMARY OSTEOARTHRITIS OF LEFT KNEE: Primary | ICD-10-CM

## 2025-02-26 PROCEDURE — 20610 DRAIN/INJ JOINT/BURSA W/O US: CPT | Mod: LT | Performed by: SPECIALIST

## 2025-02-26 PROCEDURE — 2500000004 HC RX 250 GENERAL PHARMACY W/ HCPCS (ALT 636 FOR OP/ED): Performed by: SPECIALIST

## 2025-02-26 PROCEDURE — 1036F TOBACCO NON-USER: CPT | Performed by: SPECIALIST

## 2025-02-26 PROCEDURE — 99214 OFFICE O/P EST MOD 30 MIN: CPT | Mod: 25 | Performed by: SPECIALIST

## 2025-02-26 PROCEDURE — 1159F MED LIST DOCD IN RCRD: CPT | Performed by: SPECIALIST

## 2025-02-26 PROCEDURE — 99204 OFFICE O/P NEW MOD 45 MIN: CPT | Performed by: SPECIALIST

## 2025-02-26 RX ORDER — LIDOCAINE HYDROCHLORIDE 10 MG/ML
1 INJECTION, SOLUTION INFILTRATION; PERINEURAL
Status: COMPLETED | OUTPATIENT
Start: 2025-02-26 | End: 2025-02-26

## 2025-02-26 RX ORDER — TRIAMCINOLONE ACETONIDE 40 MG/ML
40 INJECTION, SUSPENSION INTRA-ARTICULAR; INTRAMUSCULAR
Status: COMPLETED | OUTPATIENT
Start: 2025-02-26 | End: 2025-02-26

## 2025-02-26 RX ADMIN — TRIAMCINOLONE ACETONIDE 40 MG: 40 INJECTION, SUSPENSION INTRA-ARTICULAR; INTRAMUSCULAR at 16:07

## 2025-02-26 RX ADMIN — LIDOCAINE HYDROCHLORIDE 1 ML: 10 INJECTION, SOLUTION INFILTRATION; PERINEURAL at 16:07

## 2025-02-26 ASSESSMENT — ENCOUNTER SYMPTOMS
LOSS OF SENSATION IN FEET: 0
OCCASIONAL FEELINGS OF UNSTEADINESS: 1
DEPRESSION: 0

## 2025-02-26 NOTE — PROGRESS NOTES
[facility] Referred By ED Left Knee Pain   XR Done 2/20/2025    Patient having pains for about 2 weeks Sharp pain sometimes radiates up the leg. No known injury no previous surgery   Patient is diabetic Was Seen at ED 2/20/25     HPI as noted above  EXAM:    GENERAL: A/Ox3, NAD. Appears healthy, well nourished  SKIN: no erythema, rashes, or ecchymoses     MUSCULOSKELETAL:  Laterality: Left knee Exam  - Alignment: partially correctible varus deformity  - ROM: Full with mild crepitus and pain  - Effusion: none  - Strength: knee extension and flexion 5/5, EHL/PF/DF motor intact  - Palpation: TTP mostly along medial joint line  - Stability: Anterior/Posterior stable, varus/valgus stable. Mild pseudo valgus instability  - Gait: mild antalgic  - Hip Exam: flexion to 100+ degrees, full extension, internal/external rotation adequate, and no pain with log roll  - Special Tests: none performed    NEUROVASCULAR:  - Neurovascular Status: sensation intact to light touch distally  - Capillary refill brisk at extremities, Bilateral dorsalis pedis pulse 2+    RADIOGRAPHS no acute abnormalities mild degenerative changes left knee    ASSESSMENT left knee osteoarthritis possible degenerative meniscus tear.    PLAN patient tolerated the left knee Kenalog injection today and will start a home rehab program.  Follow-up with no improvement  L Inj/Asp: L knee on 2/26/2025 4:07 PM  Indications: pain  Details: 22 G needle, anterolateral approach  Medications: 1 mL lidocaine 10 mg/mL (1 %); 40 mg triamcinolone acetonide 40 mg/mL  Outcome: tolerated well, no immediate complications  Procedure, treatment alternatives, risks and benefits explained, specific risks discussed. Consent was given by the patient. Immediately prior to procedure a time out was called to verify the correct patient, procedure, equipment, support staff and site/side marked as required. Patient was prepped and draped in the usual sterile fashion.           This note was dictated  using speech recognition software and was not corrected for spelling or grammatical errors

## 2025-06-10 ENCOUNTER — HOSPITAL ENCOUNTER (OUTPATIENT)
Dept: RADIOLOGY | Facility: CLINIC | Age: 80
Discharge: HOME | End: 2025-06-10
Payer: MEDICARE

## 2025-06-10 DIAGNOSIS — M79.672 PAIN IN LEFT FOOT: ICD-10-CM

## 2025-06-10 PROCEDURE — 73620 X-RAY EXAM OF FOOT: CPT | Mod: LEFT SIDE | Performed by: RADIOLOGY

## 2025-06-10 PROCEDURE — 73630 X-RAY EXAM OF FOOT: CPT | Mod: LT

## 2025-06-10 PROCEDURE — 73630 X-RAY EXAM OF FOOT: CPT | Mod: LEFT SIDE | Performed by: RADIOLOGY
